# Patient Record
Sex: MALE | Race: OTHER | HISPANIC OR LATINO | URBAN - METROPOLITAN AREA
[De-identification: names, ages, dates, MRNs, and addresses within clinical notes are randomized per-mention and may not be internally consistent; named-entity substitution may affect disease eponyms.]

---

## 2020-08-10 ENCOUNTER — INPATIENT (INPATIENT)
Facility: HOSPITAL | Age: 50
LOS: 1 days | Discharge: HOME | DRG: 638 | End: 2020-08-12
Attending: HOSPITALIST | Admitting: STUDENT IN AN ORGANIZED HEALTH CARE EDUCATION/TRAINING PROGRAM
Payer: SELF-PAY

## 2020-08-10 VITALS
SYSTOLIC BLOOD PRESSURE: 193 MMHG | TEMPERATURE: 99 F | HEIGHT: 62 IN | DIASTOLIC BLOOD PRESSURE: 114 MMHG | RESPIRATION RATE: 18 BRPM | HEART RATE: 99 BPM | OXYGEN SATURATION: 98 % | WEIGHT: 203.05 LBS

## 2020-08-10 DIAGNOSIS — E11.00 TYPE 2 DIABETES MELLITUS WITH HYPEROSMOLARITY WITHOUT NONKETOTIC HYPERGLYCEMIC-HYPEROSMOLAR COMA (NKHHC): ICD-10-CM

## 2020-08-10 LAB
ACETONE SERPL-MCNC: ABNORMAL
ALBUMIN SERPL ELPH-MCNC: 4.5 G/DL — SIGNIFICANT CHANGE UP (ref 3.3–5.2)
ALP SERPL-CCNC: 296 U/L — HIGH (ref 40–120)
ALT FLD-CCNC: 178 U/L — HIGH
ANION GAP SERPL CALC-SCNC: 31 MMOL/L — HIGH (ref 5–17)
APPEARANCE UR: CLEAR — SIGNIFICANT CHANGE UP
AST SERPL-CCNC: 138 U/L — HIGH
BASE EXCESS BLDV CALC-SCNC: -5 MMOL/L — LOW (ref -2–2)
BASOPHILS # BLD AUTO: 0.03 K/UL — SIGNIFICANT CHANGE UP (ref 0–0.2)
BASOPHILS NFR BLD AUTO: 0.5 % — SIGNIFICANT CHANGE UP (ref 0–2)
BILIRUB SERPL-MCNC: 1 MG/DL — SIGNIFICANT CHANGE UP (ref 0.4–2)
BILIRUB UR-MCNC: NEGATIVE — SIGNIFICANT CHANGE UP
BUN SERPL-MCNC: 11 MG/DL — SIGNIFICANT CHANGE UP (ref 8–20)
CA-I SERPL-SCNC: 1.15 MMOL/L — SIGNIFICANT CHANGE UP (ref 1.15–1.33)
CALCIUM SERPL-MCNC: 9.8 MG/DL — SIGNIFICANT CHANGE UP (ref 8.6–10.2)
CHLORIDE BLDV-SCNC: 90 MMOL/L — LOW (ref 98–107)
CHLORIDE SERPL-SCNC: 78 MMOL/L — LOW (ref 98–107)
CO2 SERPL-SCNC: 16 MMOL/L — LOW (ref 22–29)
COLOR SPEC: YELLOW — SIGNIFICANT CHANGE UP
CREAT SERPL-MCNC: 0.76 MG/DL — SIGNIFICANT CHANGE UP (ref 0.5–1.3)
DIFF PNL FLD: NEGATIVE — SIGNIFICANT CHANGE UP
EOSINOPHIL # BLD AUTO: 0.01 K/UL — SIGNIFICANT CHANGE UP (ref 0–0.5)
EOSINOPHIL NFR BLD AUTO: 0.2 % — SIGNIFICANT CHANGE UP (ref 0–6)
GAS PNL BLDV: 131 MMOL/L — LOW (ref 135–145)
GAS PNL BLDV: SIGNIFICANT CHANGE UP
GAS PNL BLDV: SIGNIFICANT CHANGE UP
GLUCOSE BLDC GLUCOMTR-MCNC: 273 MG/DL — HIGH (ref 70–99)
GLUCOSE BLDC GLUCOMTR-MCNC: 367 MG/DL — HIGH (ref 70–99)
GLUCOSE BLDV-MCNC: 498 MG/DL — CRITICAL HIGH (ref 70–99)
GLUCOSE SERPL-MCNC: 606 MG/DL — CRITICAL HIGH (ref 70–99)
GLUCOSE UR QL: 1000 MG/DL
HCO3 BLDV-SCNC: 20 MMOL/L — LOW (ref 21–29)
HCT VFR BLD CALC: 40.7 % — SIGNIFICANT CHANGE UP (ref 39–50)
HCT VFR BLDA CALC: 46 — SIGNIFICANT CHANGE UP (ref 39–50)
HGB BLD CALC-MCNC: 14.9 G/DL — SIGNIFICANT CHANGE UP (ref 13–17)
HGB BLD-MCNC: 14.5 G/DL — SIGNIFICANT CHANGE UP (ref 13–17)
IMM GRANULOCYTES NFR BLD AUTO: 0.2 % — SIGNIFICANT CHANGE UP (ref 0–1.5)
KETONES UR-MCNC: ABNORMAL
LACTATE BLDV-MCNC: 1.6 MMOL/L — SIGNIFICANT CHANGE UP (ref 0.5–2)
LEUKOCYTE ESTERASE UR-ACNC: NEGATIVE — SIGNIFICANT CHANGE UP
LYMPHOCYTES # BLD AUTO: 2.37 K/UL — SIGNIFICANT CHANGE UP (ref 1–3.3)
LYMPHOCYTES # BLD AUTO: 41.6 % — SIGNIFICANT CHANGE UP (ref 13–44)
MAGNESIUM SERPL-MCNC: 2.1 MG/DL — SIGNIFICANT CHANGE UP (ref 1.6–2.6)
MCHC RBC-ENTMCNC: 33.2 PG — SIGNIFICANT CHANGE UP (ref 27–34)
MCHC RBC-ENTMCNC: 35.6 GM/DL — SIGNIFICANT CHANGE UP (ref 32–36)
MCV RBC AUTO: 93.1 FL — SIGNIFICANT CHANGE UP (ref 80–100)
MONOCYTES # BLD AUTO: 0.63 K/UL — SIGNIFICANT CHANGE UP (ref 0–0.9)
MONOCYTES NFR BLD AUTO: 11.1 % — SIGNIFICANT CHANGE UP (ref 2–14)
NEUTROPHILS # BLD AUTO: 2.65 K/UL — SIGNIFICANT CHANGE UP (ref 1.8–7.4)
NEUTROPHILS NFR BLD AUTO: 46.4 % — SIGNIFICANT CHANGE UP (ref 43–77)
NITRITE UR-MCNC: NEGATIVE — SIGNIFICANT CHANGE UP
NT-PROBNP SERPL-SCNC: 112 PG/ML — SIGNIFICANT CHANGE UP (ref 0–300)
OTHER CELLS CSF MANUAL: 20 ML/DL — SIGNIFICANT CHANGE UP (ref 18–22)
PCO2 BLDV: 33 MMHG — LOW (ref 35–50)
PH BLDV: 7.38 — SIGNIFICANT CHANGE UP (ref 7.32–7.43)
PH UR: 5 — SIGNIFICANT CHANGE UP (ref 5–8)
PLATELET # BLD AUTO: 137 K/UL — LOW (ref 150–400)
PO2 BLDV: 100 MMHG — HIGH (ref 25–45)
POTASSIUM BLDV-SCNC: 4.2 MMOL/L — SIGNIFICANT CHANGE UP (ref 3.4–4.5)
POTASSIUM SERPL-MCNC: 4.1 MMOL/L — SIGNIFICANT CHANGE UP (ref 3.5–5.3)
POTASSIUM SERPL-SCNC: 4.1 MMOL/L — SIGNIFICANT CHANGE UP (ref 3.5–5.3)
PROT SERPL-MCNC: 8.3 G/DL — SIGNIFICANT CHANGE UP (ref 6.6–8.7)
PROT UR-MCNC: NEGATIVE MG/DL — SIGNIFICANT CHANGE UP
RBC # BLD: 4.37 M/UL — SIGNIFICANT CHANGE UP (ref 4.2–5.8)
RBC # FLD: 11.8 % — SIGNIFICANT CHANGE UP (ref 10.3–14.5)
SAO2 % BLDV: 98 % — SIGNIFICANT CHANGE UP
SODIUM SERPL-SCNC: 125 MMOL/L — LOW (ref 135–145)
SP GR SPEC: 1.02 — SIGNIFICANT CHANGE UP (ref 1.01–1.02)
TROPONIN T SERPL-MCNC: <0.01 NG/ML — SIGNIFICANT CHANGE UP (ref 0–0.06)
UROBILINOGEN FLD QL: NEGATIVE MG/DL — SIGNIFICANT CHANGE UP
WBC # BLD: 5.7 K/UL — SIGNIFICANT CHANGE UP (ref 3.8–10.5)
WBC # FLD AUTO: 5.7 K/UL — SIGNIFICANT CHANGE UP (ref 3.8–10.5)

## 2020-08-10 PROCEDURE — 71045 X-RAY EXAM CHEST 1 VIEW: CPT | Mod: 26

## 2020-08-10 PROCEDURE — 99291 CRITICAL CARE FIRST HOUR: CPT

## 2020-08-10 PROCEDURE — 93010 ELECTROCARDIOGRAM REPORT: CPT

## 2020-08-10 PROCEDURE — 99221 1ST HOSP IP/OBS SF/LOW 40: CPT

## 2020-08-10 PROCEDURE — 70450 CT HEAD/BRAIN W/O DYE: CPT | Mod: 26

## 2020-08-10 RX ORDER — INSULIN HUMAN 100 [IU]/ML
2 INJECTION, SOLUTION SUBCUTANEOUS
Qty: 100 | Refills: 0 | Status: DISCONTINUED | OUTPATIENT
Start: 2020-08-10 | End: 2020-08-11

## 2020-08-10 RX ORDER — SODIUM CHLORIDE 9 MG/ML
2000 INJECTION INTRAMUSCULAR; INTRAVENOUS; SUBCUTANEOUS ONCE
Refills: 0 | Status: COMPLETED | OUTPATIENT
Start: 2020-08-10 | End: 2020-08-10

## 2020-08-10 RX ORDER — SODIUM CHLORIDE 9 MG/ML
1000 INJECTION, SOLUTION INTRAVENOUS
Refills: 0 | Status: DISCONTINUED | OUTPATIENT
Start: 2020-08-10 | End: 2020-08-10

## 2020-08-10 RX ORDER — SODIUM CHLORIDE 9 MG/ML
1000 INJECTION, SOLUTION INTRAVENOUS
Refills: 0 | Status: DISCONTINUED | OUTPATIENT
Start: 2020-08-10 | End: 2020-08-11

## 2020-08-10 RX ADMIN — INSULIN HUMAN 9 UNIT(S)/HR: 100 INJECTION, SOLUTION SUBCUTANEOUS at 21:37

## 2020-08-10 RX ADMIN — SODIUM CHLORIDE 200 MILLILITER(S): 9 INJECTION, SOLUTION INTRAVENOUS at 23:58

## 2020-08-10 RX ADMIN — INSULIN HUMAN 5 UNIT(S)/HR: 100 INJECTION, SOLUTION SUBCUTANEOUS at 22:53

## 2020-08-10 RX ADMIN — SODIUM CHLORIDE 200 MILLILITER(S): 9 INJECTION, SOLUTION INTRAVENOUS at 22:53

## 2020-08-10 RX ADMIN — SODIUM CHLORIDE 2000 MILLILITER(S): 9 INJECTION INTRAMUSCULAR; INTRAVENOUS; SUBCUTANEOUS at 21:38

## 2020-08-10 NOTE — ED STATDOCS - PROGRESS NOTE DETAILS
50 y/o M pt with no PMHx presents to the ED c/o dizziness, weight loss, fatigue, and increased urination for the past 1 month.  He also notes his BS has been elevated recently.  Denies abdominal pain.  Pt will be sent to the Main ED for further treatment and evaluation. Initial orders placed.

## 2020-08-10 NOTE — ED ADULT NURSE REASSESSMENT NOTE - NS ED NURSE REASSESS COMMENT FT1
pt resting comfortably at this time. offering no complaints. pt aware of need for insulin drip. aware of ICU admit. insulin verified by two RNS. cm in place NSR HR 79. pt resting comfortably at this time. offering no complaints. pt aware of need for insulin drip. aware of ICU admit. insulin verified by two RNS. cm in place NSR HR 79. FS prior to admin of insulin 423. MD crain aware. will notify if FS less than 300.

## 2020-08-10 NOTE — ED PROVIDER NOTE - OBJECTIVE STATEMENT
50y/o M with no significant PMHx presents to the ED c/o dizziness and generalized weakness. Assoc. sx of urinary frequency. Pt states that he presented to [facility] clinic today for dizziness, nausea, weakness and urinary frequency which has been occurring for the past month, had his bglu checked and was instructed to f/u at ED after bglu was 500+. Denies current complaints of dizziness. Denies fever, chills, vomiting or CP.   : Radha

## 2020-08-10 NOTE — H&P ADULT - ASSESSMENT
49M without any reported PMH who presents with dizziness and leg weakness, f/w hyperglycemia, DKA/HHS    Impression:  - Hyperglycemia  - DKA/HHS  - Dizziness - likely 2/2 dehydration in setting of DKA/HHS  - HAGMA  - Concomitant metabolic alkalosis with hypochloremia  - Hyponatremia    Plan:  - Check urine studies including ketones  - Will check beta hydroxybutyrate   - Pt started in insulin gtt  - Monitor for hypoglycemia, needs FSG Q1H  - Q6H BMP, hold insulin gtt for K <3.3 (last K 4.1)  - Keeping NPO for now  - When anion gap closed x2 will start weight-based long acting insulin  - Will need endocrine follow-up  - Nutrition evaluation  - DVT PPx  - Admit to MICU for management of DKA/HHS    Dale Oh M.D.  Pulmonary & Critical Care Attending  [facility]  Pulmonary Medicine at [place]

## 2020-08-10 NOTE — H&P ADULT - HISTORY OF PRESENT ILLNESS
Patient is a 49y old  Male who presents with a chief complaint of     BRIEF HOSPITAL COURSE:   49M without any reported PMH who presents with dizziness and leg weakness. He was found with hyperglycemia and DKA/HHS. He reports over the past 1 month he has not been feeling well, and has been frequently urinating multiple times per day. He does report drinking sugary drinks. Denies any fevers, chills, CP, palpitations, SOB, cough, N/V/D. He denies any syncope. In ED found with hyperglycemia and DKA/HHS, was given IVF and started on insulin gtt. ICU consulted for further management.    PAST MEDICAL & SURGICAL HISTORY:  No pertinent past medical history  No significant past surgical history    Allergies    No Known Allergies    Intolerances      FAMILY HISTORY:    Family history otherwise noncontributory.    Social History:   Denies smoking    Review of Systems:  CONSTITUTIONAL:  No fevers, chills  HEAD: No headache, +lightheadedness  EYES: No blurry vision  ENT: No epistaxis, rhinorrhea, sore throat  CARDIOVASCULAR:  No chest pain, no palpitations  RESPIRATORY:  As per HPI  GASTROINTESTINAL:  No N/V/D  GENITOURINARY:  +Urinary frequency  NEUROLOGIC:  No seizures or headaches  PSYCHIATRIC:  No disorder of thought or mood    ALL OTHER REVIEW OF SYSTEMS EXCEPT PER HPI NEGATIVE.      Medications:                  insulin regular Infusion 9 Unit(s)/Hr IV Continuous <Continuous>    multiple electrolytes Injection Type 1 1000 milliLiter(s) IV Continuous <Continuous>                ICU Vital Signs Last 24 Hrs  T(C): 37.3 (10 Aug 2020 17:58), Max: 37.3 (10 Aug 2020 17:58)  T(F): 99.2 (10 Aug 2020 17:58), Max: 99.2 (10 Aug 2020 17:58)  HR: 99 (10 Aug 2020 17:58) (99 - 99)  BP: 193/114 (10 Aug 2020 17:58) (193/114 - 193/114)  BP(mean): --  ABP: --  ABP(mean): --  RR: 18 (10 Aug 2020 17:58) (18 - 18)  SpO2: 98% (10 Aug 2020 17:58) (98% - 98%)    Vital Signs Last 24 Hrs  T(C): 37.3 (10 Aug 2020 17:58), Max: 37.3 (10 Aug 2020 17:58)  T(F): 99.2 (10 Aug 2020 17:58), Max: 99.2 (10 Aug 2020 17:58)  HR: 99 (10 Aug 2020 17:58) (99 - 99)  BP: 193/114 (10 Aug 2020 17:58) (193/114 - 193/114)  BP(mean): --  RR: 18 (10 Aug 2020 17:58) (18 - 18)  SpO2: 98% (10 Aug 2020 17:58) (98% - 98%)        I&O's Detail        LABS:                        14.5   5.70  )-----------( 137      ( 10 Aug 2020 19:47 )             40.7     08-10    125<L>  |  78<L>  |  11.0  ----------------------------<  606<HH>  4.1   |  16.0<L>  |  0.76    Ca    9.8      10 Aug 2020 19:47  Mg     2.1     08-10    TPro  8.3  /  Alb  4.5  /  TBili  1.0  /  DBili  x   /  AST  138<H>  /  ALT  178<H>  /  AlkPhos  296<H>  08-10      CARDIAC MARKERS ( 10 Aug 2020 19:47 )  x     / <0.01 ng/mL / x     / x     / x          CAPILLARY BLOOD GLUCOSE      CULTURES: N/A      Physical Examination:  GENERAL: In NAD   HEENT: NC/AT  NECK: Supple, trachea midline  PULM: CTA B/L, good inspiratory effort  CVS: +S1, S2, RRR  ABD: Soft, NT/ND  EXT: No pedal edema  SKIN: Warm and well perfused, no rashes noted.  NEURO: Alert, oriented and interactive, non-focal    DEVICES:     RADIOLOGY:   [facility] (8/10/20) - no acute intracranial findings    CRITICAL CARE TIME SPENT: 30

## 2020-08-11 LAB
A1C WITH ESTIMATED AVERAGE GLUCOSE RESULT: 15.5 % — HIGH (ref 4–5.6)
ANION GAP SERPL CALC-SCNC: 16 MMOL/L — SIGNIFICANT CHANGE UP (ref 5–17)
ANION GAP SERPL CALC-SCNC: 17 MMOL/L — SIGNIFICANT CHANGE UP (ref 5–17)
ANION GAP SERPL CALC-SCNC: 22 MMOL/L — HIGH (ref 5–17)
B-OH-BUTYR SERPL-SCNC: 4.4 MMOL/L — HIGH
BUN SERPL-MCNC: 7 MG/DL — LOW (ref 8–20)
BUN SERPL-MCNC: 7 MG/DL — LOW (ref 8–20)
BUN SERPL-MCNC: 8 MG/DL — SIGNIFICANT CHANGE UP (ref 8–20)
CALCIUM SERPL-MCNC: 8.6 MG/DL — SIGNIFICANT CHANGE UP (ref 8.6–10.2)
CALCIUM SERPL-MCNC: 8.6 MG/DL — SIGNIFICANT CHANGE UP (ref 8.6–10.2)
CALCIUM SERPL-MCNC: 8.7 MG/DL — SIGNIFICANT CHANGE UP (ref 8.6–10.2)
CHLORIDE SERPL-SCNC: 89 MMOL/L — LOW (ref 98–107)
CHLORIDE SERPL-SCNC: 90 MMOL/L — LOW (ref 98–107)
CHLORIDE SERPL-SCNC: 90 MMOL/L — LOW (ref 98–107)
CHLORIDE UR-SCNC: 109 MMOL/L — SIGNIFICANT CHANGE UP
CO2 SERPL-SCNC: 20 MMOL/L — LOW (ref 22–29)
CO2 SERPL-SCNC: 24 MMOL/L — SIGNIFICANT CHANGE UP (ref 22–29)
CO2 SERPL-SCNC: 24 MMOL/L — SIGNIFICANT CHANGE UP (ref 22–29)
CREAT ?TM UR-MCNC: 59 MG/DL — SIGNIFICANT CHANGE UP
CREAT SERPL-MCNC: 0.46 MG/DL — LOW (ref 0.5–1.3)
CREAT SERPL-MCNC: 0.48 MG/DL — LOW (ref 0.5–1.3)
CREAT SERPL-MCNC: 0.6 MG/DL — SIGNIFICANT CHANGE UP (ref 0.5–1.3)
ESTIMATED AVERAGE GLUCOSE: 398 MG/DL — HIGH (ref 68–114)
GLUCOSE BLDC GLUCOMTR-MCNC: 154 MG/DL — HIGH (ref 70–99)
GLUCOSE BLDC GLUCOMTR-MCNC: 175 MG/DL — HIGH (ref 70–99)
GLUCOSE BLDC GLUCOMTR-MCNC: 183 MG/DL — HIGH (ref 70–99)
GLUCOSE BLDC GLUCOMTR-MCNC: 208 MG/DL — HIGH (ref 70–99)
GLUCOSE BLDC GLUCOMTR-MCNC: 219 MG/DL — HIGH (ref 70–99)
GLUCOSE BLDC GLUCOMTR-MCNC: 227 MG/DL — HIGH (ref 70–99)
GLUCOSE BLDC GLUCOMTR-MCNC: 231 MG/DL — HIGH (ref 70–99)
GLUCOSE BLDC GLUCOMTR-MCNC: 244 MG/DL — HIGH (ref 70–99)
GLUCOSE BLDC GLUCOMTR-MCNC: 256 MG/DL — HIGH (ref 70–99)
GLUCOSE BLDC GLUCOMTR-MCNC: 258 MG/DL — HIGH (ref 70–99)
GLUCOSE BLDC GLUCOMTR-MCNC: 263 MG/DL — HIGH (ref 70–99)
GLUCOSE BLDC GLUCOMTR-MCNC: 277 MG/DL — HIGH (ref 70–99)
GLUCOSE BLDC GLUCOMTR-MCNC: 289 MG/DL — HIGH (ref 70–99)
GLUCOSE BLDC GLUCOMTR-MCNC: 295 MG/DL — HIGH (ref 70–99)
GLUCOSE BLDC GLUCOMTR-MCNC: 301 MG/DL — HIGH (ref 70–99)
GLUCOSE BLDC GLUCOMTR-MCNC: 423 MG/DL — HIGH (ref 70–99)
GLUCOSE SERPL-MCNC: 221 MG/DL — HIGH (ref 70–99)
GLUCOSE SERPL-MCNC: 259 MG/DL — HIGH (ref 70–99)
GLUCOSE SERPL-MCNC: 284 MG/DL — HIGH (ref 70–99)
HCT VFR BLD CALC: 36.9 % — LOW (ref 39–50)
HGB BLD-MCNC: 13.2 G/DL — SIGNIFICANT CHANGE UP (ref 13–17)
MAGNESIUM SERPL-MCNC: 1.8 MG/DL — SIGNIFICANT CHANGE UP (ref 1.6–2.6)
MAGNESIUM SERPL-MCNC: 1.9 MG/DL — SIGNIFICANT CHANGE UP (ref 1.6–2.6)
MCHC RBC-ENTMCNC: 32.8 PG — SIGNIFICANT CHANGE UP (ref 27–34)
MCHC RBC-ENTMCNC: 35.8 GM/DL — SIGNIFICANT CHANGE UP (ref 32–36)
MCV RBC AUTO: 91.8 FL — SIGNIFICANT CHANGE UP (ref 80–100)
OSMOLALITY UR: 738 MOSM/KG — SIGNIFICANT CHANGE UP (ref 300–1000)
PH UR: 5 — SIGNIFICANT CHANGE UP (ref 5–8)
PHOSPHATE SERPL-MCNC: 2.4 MG/DL — SIGNIFICANT CHANGE UP (ref 2.4–4.7)
PHOSPHATE SERPL-MCNC: 2.9 MG/DL — SIGNIFICANT CHANGE UP (ref 2.4–4.7)
PLATELET # BLD AUTO: 123 K/UL — LOW (ref 150–400)
POTASSIUM SERPL-MCNC: 2.6 MMOL/L — CRITICAL LOW (ref 3.5–5.3)
POTASSIUM SERPL-MCNC: 3.3 MMOL/L — LOW (ref 3.5–5.3)
POTASSIUM SERPL-MCNC: 3.3 MMOL/L — LOW (ref 3.5–5.3)
POTASSIUM SERPL-SCNC: 2.6 MMOL/L — CRITICAL LOW (ref 3.5–5.3)
POTASSIUM SERPL-SCNC: 3.3 MMOL/L — LOW (ref 3.5–5.3)
POTASSIUM SERPL-SCNC: 3.3 MMOL/L — LOW (ref 3.5–5.3)
PROT ?TM UR-MCNC: 12 MG/DL — SIGNIFICANT CHANGE UP (ref 0–12)
RBC # BLD: 4.02 M/UL — LOW (ref 4.2–5.8)
RBC # FLD: 11.6 % — SIGNIFICANT CHANGE UP (ref 10.3–14.5)
SARS-COV-2 RNA SPEC QL NAA+PROBE: SIGNIFICANT CHANGE UP
SODIUM SERPL-SCNC: 129 MMOL/L — LOW (ref 135–145)
SODIUM SERPL-SCNC: 130 MMOL/L — LOW (ref 135–145)
SODIUM SERPL-SCNC: 132 MMOL/L — LOW (ref 135–145)
SODIUM UR-SCNC: 97 MMOL/L — SIGNIFICANT CHANGE UP
WBC # BLD: 4.82 K/UL — SIGNIFICANT CHANGE UP (ref 3.8–10.5)
WBC # FLD AUTO: 4.82 K/UL — SIGNIFICANT CHANGE UP (ref 3.8–10.5)

## 2020-08-11 PROCEDURE — 99233 SBSQ HOSP IP/OBS HIGH 50: CPT

## 2020-08-11 RX ORDER — DEXTROSE 50 % IN WATER 50 %
25 SYRINGE (ML) INTRAVENOUS ONCE
Refills: 0 | Status: DISCONTINUED | OUTPATIENT
Start: 2020-08-11 | End: 2020-08-11

## 2020-08-11 RX ORDER — MAGNESIUM SULFATE 500 MG/ML
2 VIAL (ML) INJECTION ONCE
Refills: 0 | Status: COMPLETED | OUTPATIENT
Start: 2020-08-11 | End: 2020-08-11

## 2020-08-11 RX ORDER — DEXTROSE 50 % IN WATER 50 %
12.5 SYRINGE (ML) INTRAVENOUS ONCE
Refills: 0 | Status: DISCONTINUED | OUTPATIENT
Start: 2020-08-11 | End: 2020-08-11

## 2020-08-11 RX ORDER — GLUCAGON INJECTION, SOLUTION 0.5 MG/.1ML
1 INJECTION, SOLUTION SUBCUTANEOUS ONCE
Refills: 0 | Status: DISCONTINUED | OUTPATIENT
Start: 2020-08-11 | End: 2020-08-11

## 2020-08-11 RX ORDER — INSULIN LISPRO 100/ML
VIAL (ML) SUBCUTANEOUS
Refills: 0 | Status: DISCONTINUED | OUTPATIENT
Start: 2020-08-11 | End: 2020-08-12

## 2020-08-11 RX ORDER — LISINOPRIL 2.5 MG/1
5 TABLET ORAL DAILY
Refills: 0 | Status: DISCONTINUED | OUTPATIENT
Start: 2020-08-11 | End: 2020-08-12

## 2020-08-11 RX ORDER — POTASSIUM CHLORIDE 20 MEQ
40 PACKET (EA) ORAL ONCE
Refills: 0 | Status: COMPLETED | OUTPATIENT
Start: 2020-08-11 | End: 2020-08-11

## 2020-08-11 RX ORDER — SODIUM CHLORIDE 9 MG/ML
1000 INJECTION INTRAMUSCULAR; INTRAVENOUS; SUBCUTANEOUS
Refills: 0 | Status: DISCONTINUED | OUTPATIENT
Start: 2020-08-11 | End: 2020-08-11

## 2020-08-11 RX ORDER — LISINOPRIL 2.5 MG/1
1 TABLET ORAL
Qty: 30 | Refills: 0
Start: 2020-08-11 | End: 2020-09-09

## 2020-08-11 RX ORDER — INSULIN LISPRO 100/ML
5 VIAL (ML) SUBCUTANEOUS
Refills: 0 | Status: DISCONTINUED | OUTPATIENT
Start: 2020-08-11 | End: 2020-08-11

## 2020-08-11 RX ORDER — POTASSIUM CHLORIDE 20 MEQ
10 PACKET (EA) ORAL
Refills: 0 | Status: COMPLETED | OUTPATIENT
Start: 2020-08-11 | End: 2020-08-11

## 2020-08-11 RX ORDER — SODIUM CHLORIDE 9 MG/ML
1000 INJECTION, SOLUTION INTRAVENOUS
Refills: 0 | Status: DISCONTINUED | OUTPATIENT
Start: 2020-08-11 | End: 2020-08-11

## 2020-08-11 RX ORDER — ENOXAPARIN SODIUM 100 MG/ML
40 INJECTION SUBCUTANEOUS DAILY
Refills: 0 | Status: DISCONTINUED | OUTPATIENT
Start: 2020-08-12 | End: 2020-08-12

## 2020-08-11 RX ORDER — POTASSIUM CHLORIDE 20 MEQ
40 PACKET (EA) ORAL ONCE
Refills: 0 | Status: COMPLETED | OUTPATIENT
Start: 2020-08-11 | End: 2020-08-12

## 2020-08-11 RX ORDER — INSULIN GLARGINE 100 [IU]/ML
30 INJECTION, SOLUTION SUBCUTANEOUS EVERY MORNING
Refills: 0 | Status: DISCONTINUED | OUTPATIENT
Start: 2020-08-11 | End: 2020-08-12

## 2020-08-11 RX ORDER — DEXTROSE 50 % IN WATER 50 %
15 SYRINGE (ML) INTRAVENOUS ONCE
Refills: 0 | Status: DISCONTINUED | OUTPATIENT
Start: 2020-08-11 | End: 2020-08-11

## 2020-08-11 RX ORDER — AMLODIPINE BESYLATE 2.5 MG/1
5 TABLET ORAL ONCE
Refills: 0 | Status: COMPLETED | OUTPATIENT
Start: 2020-08-11 | End: 2020-08-11

## 2020-08-11 RX ORDER — INSULIN LISPRO 100/ML
7 VIAL (ML) SUBCUTANEOUS
Refills: 0 | Status: DISCONTINUED | OUTPATIENT
Start: 2020-08-11 | End: 2020-08-12

## 2020-08-11 RX ADMIN — Medication 100 MILLIEQUIVALENT(S): at 03:22

## 2020-08-11 RX ADMIN — Medication 40 MILLIEQUIVALENT(S): at 12:43

## 2020-08-11 RX ADMIN — INSULIN GLARGINE 30 UNIT(S): 100 INJECTION, SOLUTION SUBCUTANEOUS at 10:37

## 2020-08-11 RX ADMIN — Medication 4: at 16:20

## 2020-08-11 RX ADMIN — Medication 100 MILLIEQUIVALENT(S): at 07:04

## 2020-08-11 RX ADMIN — Medication 100 MILLIEQUIVALENT(S): at 05:46

## 2020-08-11 RX ADMIN — Medication 100 MILLIEQUIVALENT(S): at 10:39

## 2020-08-11 RX ADMIN — SODIUM CHLORIDE 75 MILLILITER(S): 9 INJECTION, SOLUTION INTRAVENOUS at 17:58

## 2020-08-11 RX ADMIN — Medication 100 MILLIEQUIVALENT(S): at 11:37

## 2020-08-11 RX ADMIN — Medication 40 MILLIEQUIVALENT(S): at 02:26

## 2020-08-11 RX ADMIN — Medication 100 MILLIEQUIVALENT(S): at 12:44

## 2020-08-11 RX ADMIN — Medication 50 GRAM(S): at 02:25

## 2020-08-11 RX ADMIN — Medication 5 UNIT(S): at 16:20

## 2020-08-11 RX ADMIN — INSULIN HUMAN 2 UNIT(S)/HR: 100 INJECTION, SOLUTION SUBCUTANEOUS at 06:12

## 2020-08-11 RX ADMIN — AMLODIPINE BESYLATE 5 MILLIGRAM(S): 2.5 TABLET ORAL at 12:44

## 2020-08-11 RX ADMIN — Medication 6: at 11:05

## 2020-08-11 RX ADMIN — LISINOPRIL 5 MILLIGRAM(S): 2.5 TABLET ORAL at 17:58

## 2020-08-11 RX ADMIN — Medication 5 UNIT(S): at 11:05

## 2020-08-11 RX ADMIN — Medication 50 GRAM(S): at 10:38

## 2020-08-11 RX ADMIN — SODIUM CHLORIDE 100 MILLILITER(S): 9 INJECTION INTRAMUSCULAR; INTRAVENOUS; SUBCUTANEOUS at 10:44

## 2020-08-11 NOTE — PROGRESS NOTE ADULT - SUBJECTIVE AND OBJECTIVE BOX
Downgrade from MICU acceptance Note    HPI:  Patient is a 49y old  Male who presents with a chief complaint of Dizziness, polyuria.     BRIEF HOSPITAL COURSE:   49M without any reported PMHx, does not really see PCP, questionable HTN in the past, who presented with c/o dizziness, leg weakness and urinary frequency. He was found to have hyperglycemia and DKA/HHS. He reports over the past 1 month he has not been feeling well, and has been frequently urinating multiple times per day. He does report drinking sugary drinks.    Denies any fevers, chills, CP, palpitations, SOB, cough, N/V/D. He denies any syncope.    In ED found to have hyperglycemia, anion gap metabolic acidosis, was given aggressive fluid resuscitation and started on insulin gtt. Now anion gap closed and patient downgraded from MICU care.    PAST MEDICAL & SURGICAL HISTORY:  No pertinent past medical history  No significant past surgical history    Allergies: No Known Allergies    FAMILY HISTORY: HTN on father side, Sister has DM    Family history otherwise noncontributory.    Social History: daily ETOH use  Hx of smoking cigars.     Review of Systems:  CONSTITUTIONAL:  No fevers, chills  HEAD: No headache, +lightheadedness  EYES: No blurry vision  ENT: No epistaxis, rhinorrhea, sore throat  CARDIOVASCULAR:  No chest pain, no palpitations  RESPIRATORY:  As per HPI  GASTROINTESTINAL:  No N/V/D  GENITOURINARY:  +Urinary frequency  NEUROLOGIC:  No seizures or headaches  PSYCHIATRIC:  No disorder of thought or mood    ALL OTHER REVIEW OF SYSTEMS EXCEPT PER HPI NEGATIVE.    Vital Signs Last 24 Hrs  T(C): 37.3 (10 Aug 2020 17:58), Max: 37.3 (10 Aug 2020 17:58)  T(F): 99.2 (10 Aug 2020 17:58), Max: 99.2 (10 Aug 2020 17:58)  HR: 99 (10 Aug 2020 17:58) (99 - 99)  BP: 193/114 (10 Aug 2020 17:58) (193/114 - 193/114)  BP(mean): --  RR: 18 (10 Aug 2020 17:58) (18 - 18)  SpO2: 98% (10 Aug 2020 17:58) (98% - 98%)    Physical Exam:   GENERAL: well-groomed, well-developed, NAD  HEENT: head NC/AT; EOM intact, PERRLA, conjunctiva & sclera clear; moist mucous membranes, good dentition  NECK: supple, no JVD, no thyromegaly  RESPIRATORY: CTA B/L, no wheezing, rales, rhonchi or rubs  CARDIOVASCULAR: S1&S2, RRR, no murmurs or gallops  ABDOMEN: soft, non-tender, non-distended, BS+,   MUSCULOSKELETAL: no muscle atrophy, no clubbing, cyanosis or edema of extremities, no calf tenderness   VASCULAR: peripheral pulses 2+,  SKIN: No rashes, bruises or scars   NEUROLOGIC: AA&O X3, CN2-12 intact w/ no focal deficits    LABS:                        14.5   5.70  )-----------( 137      ( 10 Aug 2020 19:47 )             40.7     08-10    125<L>  |  78<L>  |  11.0  ----------------------------<  606<HH>  4.1   |  16.0<L>  |  0.76    Ca    9.8      10 Aug 2020 19:47  Mg     2.1     08-10    TPro  8.3  /  Alb  4.5  /  TBili  1.0  /  DBili  x   /  AST  138<H>  /  ALT  178<H>  /  AlkPhos  296<H>  08-10      CARDIAC MARKERS ( 10 Aug 2020 19:47 )  x     / <0.01 ng/mL / x     / x     / x          CAPILLARY BLOOD GLUCOSE  POCT Blood Glucose.: 301 mg/dL (11 Aug 2020 13:01)  POCT Blood Glucose.: 289 mg/dL (11 Aug 2020 11:55)  POCT Blood Glucose.: 295 mg/dL (11 Aug 2020 11:01)  POCT Blood Glucose.: 258 mg/dL (11 Aug 2020 10:00)  POCT Blood Glucose.: 256 mg/dL (11 Aug 2020 09:02)  POCT Blood Glucose.: 227 mg/dL (11 Aug 2020 08:04)  POCT Blood Glucose.: 175 mg/dL (11 Aug 2020 07:03)  POCT Blood Glucose.: 154 mg/dL (11 Aug 2020 06:06)  POCT Blood Glucose.: 183 mg/dL (11 Aug 2020 04:58)  POCT Blood Glucose.: 208 mg/dL (11 Aug 2020 03:51)  POCT Blood Glucose.: 219 mg/dL (11 Aug 2020 03:03)  POCT Blood Glucose.: 263 mg/dL (11 Aug 2020 01:42)  POCT Blood Glucose.: 277 mg/dL (11 Aug 2020 00:39)  POCT Blood Glucose.: 273 mg/dL (10 Aug 2020 23:38)  POCT Blood Glucose.: 367 mg/dL (10 Aug 2020 22:31)  POCT Blood Glucose.: 423 mg/dL (10 Aug 2020 21:31)      RADIOLOGY:   [facility] (8/10/20) - no acute intracranial findings    < from: Xray Chest 1 View-PORTABLE IMMEDIATE (08.10.20 @ 20:12) >    INTERPRETATION:  AP chest on August 10, 2020 at 7:48 PM. Patient has dizziness and chest pain with shortness of breath. Covid virus testing is pending.Patient's blood glucose was over 500.    COMPARISON: None available.    Heart is normal for projection.    The lung fields and pleural surfaces are unremarkable.    IMPRESSION: Negative chest.    < end of copied text > Downgrade from MICU acceptance Note    HPI:  Patient is a 49y old  Male who presents with a chief complaint of Dizziness, polyuria.     BRIEF HOSPITAL COURSE:   49M without any reported PMHx, does not really see PCP, questionable HTN in the past, who presented with c/o dizziness, leg weakness and urinary frequency. He was found to have hyperglycemia and DKA/HHS. He reports over the past 1 month he has not been feeling well, and has been frequently urinating multiple times per day. He does report drinking sugary drinks.    Denies any fevers, chills, CP, palpitations, SOB, cough, N/V/D. He denies any syncope.    In ED found to have hyperglycemia, anion gap metabolic acidosis, was given aggressive fluid resuscitation and started on insulin gtt. Now anion gap closed and patient downgraded from MICU care.    PAST MEDICAL & SURGICAL HISTORY:  No pertinent past medical history - has not seen a PCP in 8+ years  No significant past surgical history    Allergies: No Known Allergies    FAMILY HISTORY: HTN on father side, Sister has DM    Family history otherwise noncontributory.    Social History: daily ETOH use  Hx of smoking cigars.     Review of Systems:  CONSTITUTIONAL:  No fevers, chills  HEAD: No headache,   CARDIOVASCULAR:  No chest pain, no palpitations  RESPIRATORY:  NO SOB, cough, no difficulty breathing  GASTROINTESTINAL:  No N/V/D, no abdominal pain  GENITOURINARY:  +Urinary frequency  NEUROLOGIC:  No seizures or headaches    ALL OTHER REVIEW OF SYSTEMS EXCEPT PER HPI NEGATIVE.    Vital Signs Last 24 Hrs  T(C): 37.3 (10 Aug 2020 17:58), Max: 37.3 (10 Aug 2020 17:58)  T(F): 99.2 (10 Aug 2020 17:58), Max: 99.2 (10 Aug 2020 17:58)  HR: 99 (10 Aug 2020 17:58) (99 - 99)  BP: 193/114 (10 Aug 2020 17:58) (193/114 - 193/114)  BP(mean): --  RR: 18 (10 Aug 2020 17:58) (18 - 18)  SpO2: 98% (10 Aug 2020 17:58) (98% - 98%)    Physical Exam:   GENERAL: well-groomed, well-developed, NAD  HEENT: head NC/AT; EOM intact, PERRLA, conjunctiva & sclera clear; moist mucous membranes, good dentition  NECK: supple, no JVD, no thyromegaly  RESPIRATORY: CTA B/L, no wheezing, rales, rhonchi or rubs  CARDIOVASCULAR: S1&S2, RRR, no murmurs or gallops  ABDOMEN: soft, non-tender, non-distended, BS+,   MUSCULOSKELETAL: no muscle atrophy, no clubbing, cyanosis or edema of extremities, no calf tenderness   VASCULAR: peripheral pulses 2+,  SKIN: No rashes, bruises or scars   NEUROLOGIC: AA&O X3, CN2-12 intact w/ no focal deficits    LABS:                        14.5   5.70  )-----------( 137      ( 10 Aug 2020 19:47 )             40.7     08-10    125<L>  |  78<L>  |  11.0  ----------------------------<  606<HH>  4.1   |  16.0<L>  |  0.76    Ca    9.8      10 Aug 2020 19:47  Mg     2.1     08-10    TPro  8.3  /  Alb  4.5  /  TBili  1.0  /  DBili  x   /  AST  138<H>  /  ALT  178<H>  /  AlkPhos  296<H>  08-10      CARDIAC MARKERS ( 10 Aug 2020 19:47 )  x     / <0.01 ng/mL / x     / x     / x          CAPILLARY BLOOD GLUCOSE  POCT Blood Glucose.: 301 mg/dL (11 Aug 2020 13:01)  POCT Blood Glucose.: 289 mg/dL (11 Aug 2020 11:55)  POCT Blood Glucose.: 295 mg/dL (11 Aug 2020 11:01)  POCT Blood Glucose.: 258 mg/dL (11 Aug 2020 10:00)  POCT Blood Glucose.: 256 mg/dL (11 Aug 2020 09:02)  POCT Blood Glucose.: 227 mg/dL (11 Aug 2020 08:04)  POCT Blood Glucose.: 175 mg/dL (11 Aug 2020 07:03)  POCT Blood Glucose.: 154 mg/dL (11 Aug 2020 06:06)  POCT Blood Glucose.: 183 mg/dL (11 Aug 2020 04:58)  POCT Blood Glucose.: 208 mg/dL (11 Aug 2020 03:51)  POCT Blood Glucose.: 219 mg/dL (11 Aug 2020 03:03)  POCT Blood Glucose.: 263 mg/dL (11 Aug 2020 01:42)  POCT Blood Glucose.: 277 mg/dL (11 Aug 2020 00:39)  POCT Blood Glucose.: 273 mg/dL (10 Aug 2020 23:38)  POCT Blood Glucose.: 367 mg/dL (10 Aug 2020 22:31)  POCT Blood Glucose.: 423 mg/dL (10 Aug 2020 21:31)      RADIOLOGY:   [facility] (8/10/20) - no acute intracranial findings    < from: Xray Chest 1 View-PORTABLE IMMEDIATE (08.10.20 @ 20:12) >    INTERPRETATION:  AP chest on August 10, 2020 at 7:48 PM. Patient has dizziness and chest pain with shortness of breath. Covid virus testing is pending.Patient's blood glucose was over 500.    COMPARISON: None available.    Heart is normal for projection.    The lung fields and pleural surfaces are unremarkable.    IMPRESSION: Negative chest.    < end of copied text >

## 2020-08-11 NOTE — ED ADULT NURSE REASSESSMENT NOTE - NS ED NURSE REASSESS COMMENT FT1
Spoke with KATHERINE Ng, was informed pt with be bridged with lantis and given K and Mg,  pt will also be downgraded.

## 2020-08-11 NOTE — PROGRESS NOTE ADULT - ASSESSMENT
49M without any reported PMH who presents with dizziness and leg weakness, f/w hyperglycemia, DKA/HHS    DKA/HHS  - s/p Insulin gtt - FS remain elevated  - anion gap now closed  - urine +ketone/hydroxy butyrate  - now on Lantus 30U weight based   - ICU consult appreciated now longer requiring ICU level of care  - Metabolic anion gap acidosis with hypochloremia  - Endocrine consult pending  - diabetic educator and nutrition consult    HTN  - continue to monitor  - BP now better controlled  - may need to start antihypertensives   - outpatient f/u    Electrolyte abnormalities  - hypokalemia 2/2 to metabolic acidosis - replenished  - Hyponatremia, hypochloremia likely 2/2 to dehydration, acidosis  - continue to monitor and replenish as needed   - f.u repeat BMP 49M without any reported PMH who presents with dizziness and leg weakness, f/w hyperglycemia, DKA/HHS    DKA/HHS  - s/p Insulin gtt - FS remain elevated  - will continue IVF due to elevated BS, and electrolyte abnormalities  - anion gap now closed  - urine +ketone/hydroxy butyrate  - now on Lantus 30U weight based   - ICU consult appreciated now longer requiring ICU level of care  - Metabolic anion gap acidosis with hypochloremia  - Endocrine consult pending  - diabetic educator and nutrition consult    HTN  - stable  - significantly elevated BP on admission likely 2/2 to dehydration  - BP now improved  - will start low dose lisinopril, renal protection  - outpatient f/u    Electrolyte abnormalities  - hypokalemia 2/2 to metabolic acidosis - replenished  - Hyponatremia, hypochloremia likely 2/2 to dehydration, acidosis  - continue to monitor and replenish as needed   - f.u repeat BMP

## 2020-08-11 NOTE — PROGRESS NOTE ADULT - ATTENDING COMMENTS
I have personally seen and examined patient.  Above note reviewed and discussed with resident, modified where appropriate. I have personally seen and examined patient.  Above note reviewed and discussed with resident, modified where appropriate. Pt without any known hx of DM. Family hx of DM in mom. Found to be in DKA/ HHS. Gap closed. Transitioned to Lantus & humalog. Pt eating & drinking. Endocrine consult called. Diabetic teaching. Start lisinopril. Check islet cell antibodies & ANG        PHYSICAL EXAM-  GENERAL: NAD, well-groomed, well-developed  HEAD:  Atraumatic, Normocephalic  EYES: EOMI, PERRLA, conjunctiva and sclera clear  NECK: Supple, No JVD  NERVOUS SYSTEM:  Alert & Oriented X3, Motor Strength 5/5 B/L upper and lower extremities; DTRs 2+ intact and symmetric  CHEST/LUNG: Clear to auscultation bilaterally; No rales, rhonchi, wheezing, or rubs  HEART: Regular rate and rhythm; No murmurs, rubs, or gallops  ABDOMEN: Soft, Nontender, Nondistended; Bowel sounds present  EXTREMITIES:  2+ Peripheral Pulses, No clubbing, cyanosis, or edema  SKIN: No rashes or lesions

## 2020-08-11 NOTE — ED ADULT NURSE NOTE - CHIEF COMPLAINT QUOTE
Patient arrived to ED today with c/o dizziness, dry mouth, lost 30lbs in a month, weakness for the past month.

## 2020-08-11 NOTE — ED ADULT NURSE NOTE - ED STAT RN HANDOFF WHERE
[de-identified] : s/p  left total knee replacement 12/19/2019 [de-identified] : 66 year old male presents s/p left total knee replacement. Decrease in preoperative pain but still experiences constant pain lateral left thigh. Also notes chronic lower back pain with radiation into left buttock and proximal lateral thigh. Able to go up and down stairs. Struggling with full knee extension. Denies fever, chills, groin pain.  [de-identified] : well nourished and no distress\par left knee: incision healed, PROM 10/110 pain free, ligaments intact.  Neurovascularly intact. Tender junction middle thigh over vastus lateralis [de-identified] : left total knee replacement \par pain thigh of undetermined origin [de-identified] : Continue PT. Obtain MRI left thigh. FU with results. ED

## 2020-08-11 NOTE — ED ADULT NURSE REASSESSMENT NOTE - NS ED NURSE REASSESS COMMENT FT1
hourly updates made to MICU PA in regards to blood sugar. pt resting comfortably. IV fluids and medications infusing as ordered. additional urine tests sent. vitals remain stable.

## 2020-08-11 NOTE — ED ADULT NURSE REASSESSMENT NOTE - NS ED NURSE REASSESS COMMENT FT1
Received report from nightshift RN.  Pt a&ox4.  Pt denies pain and states he feels better.  NAD noted, respirations even and unlabored.  #18 to LAC, #20 to RAC, and #20 on right wrist patent with no signs of infiltration.  Pt resting comfortably in stretcher with safety precautions in place.  Plan of care explained, pt verbalized understanding.

## 2020-08-11 NOTE — ED ADULT NURSE REASSESSMENT NOTE - NS ED NURSE REASSESS COMMENT FT1
Pt a&ox4.  NAD noted.  Pt sitting comfortably in bed, provided with lunch.  Safety precautions in place.  Plan of care explained pt verbalized understanding.  ICU MD at bedside.

## 2020-08-11 NOTE — ED ADULT NURSE REASSESSMENT NOTE - NS ED NURSE REASSESS COMMENT FT1
Pt a&ox4 states he "feels fine".  NAD noted.  pt waiting to be admitted.  Comfort care provided.  Pt resting comfortably in stretcher.  Plan of care explained, pt verbalized understanding.

## 2020-08-12 VITALS
SYSTOLIC BLOOD PRESSURE: 133 MMHG | TEMPERATURE: 99 F | DIASTOLIC BLOOD PRESSURE: 80 MMHG | OXYGEN SATURATION: 98 % | RESPIRATION RATE: 20 BRPM | HEART RATE: 70 BPM

## 2020-08-12 DIAGNOSIS — E78.5 HYPERLIPIDEMIA, UNSPECIFIED: ICD-10-CM

## 2020-08-12 LAB
ANION GAP SERPL CALC-SCNC: 17 MMOL/L — SIGNIFICANT CHANGE UP (ref 5–17)
BASOPHILS # BLD AUTO: 0.03 K/UL — SIGNIFICANT CHANGE UP (ref 0–0.2)
BASOPHILS NFR BLD AUTO: 0.6 % — SIGNIFICANT CHANGE UP (ref 0–2)
BUN SERPL-MCNC: 8 MG/DL — SIGNIFICANT CHANGE UP (ref 8–20)
CALCIUM SERPL-MCNC: 9.1 MG/DL — SIGNIFICANT CHANGE UP (ref 8.6–10.2)
CHLORIDE SERPL-SCNC: 92 MMOL/L — LOW (ref 98–107)
CHOLEST SERPL-MCNC: 436 MG/DL — HIGH (ref 110–199)
CO2 SERPL-SCNC: 24 MMOL/L — SIGNIFICANT CHANGE UP (ref 22–29)
CREAT SERPL-MCNC: 0.52 MG/DL — SIGNIFICANT CHANGE UP (ref 0.5–1.3)
EOSINOPHIL # BLD AUTO: 0.05 K/UL — SIGNIFICANT CHANGE UP (ref 0–0.5)
EOSINOPHIL NFR BLD AUTO: 1 % — SIGNIFICANT CHANGE UP (ref 0–6)
GLUCOSE BLDC GLUCOMTR-MCNC: 247 MG/DL — HIGH (ref 70–99)
GLUCOSE BLDC GLUCOMTR-MCNC: 256 MG/DL — HIGH (ref 70–99)
GLUCOSE BLDC GLUCOMTR-MCNC: 263 MG/DL — HIGH (ref 70–99)
GLUCOSE SERPL-MCNC: 278 MG/DL — HIGH (ref 70–99)
HCT VFR BLD CALC: 41.5 % — SIGNIFICANT CHANGE UP (ref 39–50)
HDLC SERPL-MCNC: 25 MG/DL — LOW
HGB BLD-MCNC: 14.6 G/DL — SIGNIFICANT CHANGE UP (ref 13–17)
IMM GRANULOCYTES NFR BLD AUTO: 0.4 % — SIGNIFICANT CHANGE UP (ref 0–1.5)
LIPID PNL WITH DIRECT LDL SERPL: SIGNIFICANT CHANGE UP MG/DL
LYMPHOCYTES # BLD AUTO: 2.2 K/UL — SIGNIFICANT CHANGE UP (ref 1–3.3)
LYMPHOCYTES # BLD AUTO: 43 % — SIGNIFICANT CHANGE UP (ref 13–44)
MAGNESIUM SERPL-MCNC: 2 MG/DL — SIGNIFICANT CHANGE UP (ref 1.8–2.6)
MCHC RBC-ENTMCNC: 33 PG — SIGNIFICANT CHANGE UP (ref 27–34)
MCHC RBC-ENTMCNC: 35.2 GM/DL — SIGNIFICANT CHANGE UP (ref 32–36)
MCV RBC AUTO: 93.9 FL — SIGNIFICANT CHANGE UP (ref 80–100)
MONOCYTES # BLD AUTO: 0.59 K/UL — SIGNIFICANT CHANGE UP (ref 0–0.9)
MONOCYTES NFR BLD AUTO: 11.5 % — SIGNIFICANT CHANGE UP (ref 2–14)
NEUTROPHILS # BLD AUTO: 2.23 K/UL — SIGNIFICANT CHANGE UP (ref 1.8–7.4)
NEUTROPHILS NFR BLD AUTO: 43.5 % — SIGNIFICANT CHANGE UP (ref 43–77)
PHOSPHATE SERPL-MCNC: 4.1 MG/DL — SIGNIFICANT CHANGE UP (ref 2.4–4.7)
PLATELET # BLD AUTO: 129 K/UL — LOW (ref 150–400)
POTASSIUM SERPL-MCNC: 4 MMOL/L — SIGNIFICANT CHANGE UP (ref 3.5–5.3)
POTASSIUM SERPL-SCNC: 4 MMOL/L — SIGNIFICANT CHANGE UP (ref 3.5–5.3)
RBC # BLD: 4.42 M/UL — SIGNIFICANT CHANGE UP (ref 4.2–5.8)
RBC # FLD: 12.2 % — SIGNIFICANT CHANGE UP (ref 10.3–14.5)
SARS-COV-2 IGG SERPL QL IA: POSITIVE
SARS-COV-2 IGM SERPL IA-ACNC: 8.16 INDEX — HIGH
SODIUM SERPL-SCNC: 133 MMOL/L — LOW (ref 135–145)
TOTAL CHOLESTEROL/HDL RATIO MEASUREMENT: 17 RATIO — HIGH (ref 3.4–9.6)
TRIGL SERPL-MCNC: 521 MG/DL — HIGH (ref 10–200)
WBC # BLD: 5.12 K/UL — SIGNIFICANT CHANGE UP (ref 3.8–10.5)
WBC # FLD AUTO: 5.12 K/UL — SIGNIFICANT CHANGE UP (ref 3.8–10.5)

## 2020-08-12 PROCEDURE — 83605 ASSAY OF LACTIC ACID: CPT

## 2020-08-12 PROCEDURE — 82009 KETONE BODYS QUAL: CPT

## 2020-08-12 PROCEDURE — 85027 COMPLETE CBC AUTOMATED: CPT

## 2020-08-12 PROCEDURE — 86341 ISLET CELL ANTIBODY: CPT

## 2020-08-12 PROCEDURE — 80061 LIPID PANEL: CPT

## 2020-08-12 PROCEDURE — 99239 HOSP IP/OBS DSCHRG MGMT >30: CPT

## 2020-08-12 PROCEDURE — 82435 ASSAY OF BLOOD CHLORIDE: CPT

## 2020-08-12 PROCEDURE — 93005 ELECTROCARDIOGRAM TRACING: CPT

## 2020-08-12 PROCEDURE — 87635 SARS-COV-2 COVID-19 AMP PRB: CPT

## 2020-08-12 PROCEDURE — 84484 ASSAY OF TROPONIN QUANT: CPT

## 2020-08-12 PROCEDURE — 82947 ASSAY GLUCOSE BLOOD QUANT: CPT

## 2020-08-12 PROCEDURE — 70450 CT HEAD/BRAIN W/O DYE: CPT

## 2020-08-12 PROCEDURE — 82803 BLOOD GASES ANY COMBINATION: CPT

## 2020-08-12 PROCEDURE — 83735 ASSAY OF MAGNESIUM: CPT

## 2020-08-12 PROCEDURE — 36415 COLL VENOUS BLD VENIPUNCTURE: CPT

## 2020-08-12 PROCEDURE — 83935 ASSAY OF URINE OSMOLALITY: CPT

## 2020-08-12 PROCEDURE — 85014 HEMATOCRIT: CPT

## 2020-08-12 PROCEDURE — 82962 GLUCOSE BLOOD TEST: CPT

## 2020-08-12 PROCEDURE — 82570 ASSAY OF URINE CREATININE: CPT

## 2020-08-12 PROCEDURE — 84100 ASSAY OF PHOSPHORUS: CPT

## 2020-08-12 PROCEDURE — 84295 ASSAY OF SERUM SODIUM: CPT

## 2020-08-12 PROCEDURE — 83036 HEMOGLOBIN GLYCOSYLATED A1C: CPT

## 2020-08-12 PROCEDURE — 71045 X-RAY EXAM CHEST 1 VIEW: CPT

## 2020-08-12 PROCEDURE — 82436 ASSAY OF URINE CHLORIDE: CPT

## 2020-08-12 PROCEDURE — 84156 ASSAY OF PROTEIN URINE: CPT

## 2020-08-12 PROCEDURE — 82010 KETONE BODYS QUAN: CPT

## 2020-08-12 PROCEDURE — 99223 1ST HOSP IP/OBS HIGH 75: CPT

## 2020-08-12 PROCEDURE — 84300 ASSAY OF URINE SODIUM: CPT

## 2020-08-12 PROCEDURE — 84132 ASSAY OF SERUM POTASSIUM: CPT

## 2020-08-12 PROCEDURE — 80053 COMPREHEN METABOLIC PANEL: CPT

## 2020-08-12 PROCEDURE — 81003 URINALYSIS AUTO W/O SCOPE: CPT

## 2020-08-12 PROCEDURE — 82330 ASSAY OF CALCIUM: CPT

## 2020-08-12 PROCEDURE — 80048 BASIC METABOLIC PNL TOTAL CA: CPT

## 2020-08-12 PROCEDURE — 83986 ASSAY PH BODY FLUID NOS: CPT

## 2020-08-12 PROCEDURE — T1013: CPT

## 2020-08-12 PROCEDURE — 86769 SARS-COV-2 COVID-19 ANTIBODY: CPT

## 2020-08-12 PROCEDURE — 99285 EMERGENCY DEPT VISIT HI MDM: CPT | Mod: 25

## 2020-08-12 PROCEDURE — 83880 ASSAY OF NATRIURETIC PEPTIDE: CPT

## 2020-08-12 RX ORDER — INSULIN NPH HUM/REG INSULIN HM 70-30/ML
30 VIAL (ML) SUBCUTANEOUS
Qty: 1 | Refills: 0
Start: 2020-08-12 | End: 2020-09-10

## 2020-08-12 RX ORDER — FENOFIBRATE,MICRONIZED 130 MG
1 CAPSULE ORAL
Qty: 30 | Refills: 1
Start: 2020-08-12 | End: 2020-10-10

## 2020-08-12 RX ORDER — ATORVASTATIN CALCIUM 80 MG/1
80 TABLET, FILM COATED ORAL AT BEDTIME
Refills: 0 | Status: DISCONTINUED | OUTPATIENT
Start: 2020-08-12 | End: 2020-08-12

## 2020-08-12 RX ORDER — LISINOPRIL 2.5 MG/1
1 TABLET ORAL
Qty: 30 | Refills: 2
Start: 2020-08-12 | End: 2020-11-09

## 2020-08-12 RX ORDER — ATORVASTATIN CALCIUM 80 MG/1
1 TABLET, FILM COATED ORAL
Qty: 30 | Refills: 1
Start: 2020-08-12 | End: 2020-10-10

## 2020-08-12 RX ORDER — FENOFIBRATE,MICRONIZED 130 MG
145 CAPSULE ORAL DAILY
Refills: 0 | Status: DISCONTINUED | OUTPATIENT
Start: 2020-08-12 | End: 2020-08-12

## 2020-08-12 RX ADMIN — INSULIN GLARGINE 30 UNIT(S): 100 INJECTION, SOLUTION SUBCUTANEOUS at 08:35

## 2020-08-12 RX ADMIN — Medication 40 MILLIEQUIVALENT(S): at 05:30

## 2020-08-12 RX ADMIN — Medication 6: at 16:44

## 2020-08-12 RX ADMIN — Medication 7 UNIT(S): at 08:34

## 2020-08-12 RX ADMIN — LISINOPRIL 5 MILLIGRAM(S): 2.5 TABLET ORAL at 05:31

## 2020-08-12 RX ADMIN — Medication 7 UNIT(S): at 16:44

## 2020-08-12 RX ADMIN — Medication 7 UNIT(S): at 11:58

## 2020-08-12 RX ADMIN — ENOXAPARIN SODIUM 40 MILLIGRAM(S): 100 INJECTION SUBCUTANEOUS at 11:58

## 2020-08-12 RX ADMIN — Medication 6: at 11:59

## 2020-08-12 RX ADMIN — Medication 4: at 08:35

## 2020-08-12 NOTE — DISCHARGE NOTE NURSING/CASE MANAGEMENT/SOCIAL WORK - NSDCFUADDAPPT_GEN_ALL_CORE_FT
Appt [facility] Monday 8/24/20 at 2:00 with Dr Galindo Appt at [facility] in [place] on 8/13/2020 at 3pm  Appt at [facility] in [place] on 8/13/2020 at 3pm Dr Armin Butler..Pt given appt card too.

## 2020-08-12 NOTE — DISCHARGE NOTE PROVIDER - PROVIDER TOKENS
FREE:[LAST:[[facility]],PHONE:[(   )    -],FAX:[(   )    -],ADDRESS:[[facility]]],PROVIDER:[TOKEN:[[id number]]]

## 2020-08-12 NOTE — DISCHARGE NOTE PROVIDER - NSDCCPCAREPLAN_GEN_ALL_CORE_FT
PRINCIPAL DISCHARGE DIAGNOSIS  Diagnosis: Hyperosmolar non-ketotic state due to type 2 diabetes mellitus  Assessment and Plan of Treatment: Continue with meds as directed. Follow up with your primary doctor & endocrinology within 1 week of discharge      SECONDARY DISCHARGE DIAGNOSES  Diagnosis: Mild HTN  Assessment and Plan of Treatment: Follow up with your primary doctor within 1 week of discharge. DASH diet recommended

## 2020-08-12 NOTE — DISCHARGE NOTE PROVIDER - NSDCMRMEDTOKEN_GEN_ALL_CORE_FT
.DME supplies: vials, syringes, needles, glucometer, test strips, alcohol swabs, lancets for testing bid  atorvastatin 40 mg oral tablet: 1 tab(s) orally once a day (at bedtime)   lisinopril 5 mg oral tablet: 1 tab(s) orally once a day  NovoLIN 70/30 subcutaneous suspension: 30 unit(s) subcutaneous 2 times a day (with meals) , with breakfast &amp; dinner  TriCor 145 mg oral tablet: 1 tab(s) orally once a day

## 2020-08-12 NOTE — CONSULT NOTE ADULT - SUBJECTIVE AND OBJECTIVE BOX
HPI:    49M without any reported PMH who presents with dizziness and leg weakness. He was found with hyperglycemia and DKA/HHS. He reports over the past 1 month he has not been feeling well, and has been frequently urinating multiple times per day. He does report drinking sugary drinks. Denies any fevers, chills, CP, palpitations, SOB, cough, N/V/D. He denies any syncope. In ED found with hyperglycemia and DKA/HHS, was given IVF and started on insulin gtt.     PAST MEDICAL & SURGICAL HISTORY:  No pertinent past medical history  No significant past surgical history    Allergies  No Known Allergies    Intolerances      FAMILY HISTORY:  Family history otherwise noncontributory.    Social History:   Denies smoking    Review of Systems:  CONSTITUTIONAL:  No fevers, chills  HEAD: No headache, +lightheadedness  EYES: No blurry vision  ENT: No epistaxis, rhinorrhea, sore throat  CARDIOVASCULAR:  No chest pain, no palpitations  RESPIRATORY:  As per HPI  GASTROINTESTINAL:  No N/V/D  GENITOURINARY:  +Urinary frequency  NEUROLOGIC:  No seizures or headaches  PSYCHIATRIC:  No disorder of thought or mood    Medications:insulin regular Infusion 9 Unit(s)/Hr IV Continuous   multiple electrolytes Injection Type 1 1000 milliLiter(s) IV Continuous <Continuous>      ICU Vital Signs Last 24 Hrs  T(C): 37.3 (10 Aug 2020 17:58), Max: 37.3 (10 Aug 2020 17:58)  T(F): 99.2 (10 Aug 2020 17:58), Max: 99.2 (10 Aug 2020 17:58)  HR: 99 (10 Aug 2020 17:58) (99 - 99)  BP: 193/114 (10 Aug 2020 17:58) (193/114 - 193/114)  BP(mean): --  ABP: --  ABP(mean): --  RR: 18 (10 Aug 2020 17:58) (18 - 18)  SpO2: 98% (10 Aug 2020 17:58) (98% - 98%)    Vital Signs Last 24 Hrs  T(C): 37.3 (10 Aug 2020 17:58), Max: 37.3 (10 Aug 2020 17:58)  T(F): 99.2 (10 Aug 2020 17:58), Max: 99.2 (10 Aug 2020 17:58)  HR: 99 (10 Aug 2020 17:58) (99 - 99)  BP: 193/114 (10 Aug 2020 17:58) (193/114 - 193/114)  BP(mean): --  RR: 18 (10 Aug 2020 17:58) (18 - 18)  SpO2: 98% (10 Aug 2020 17:58) (98% - 98%)    LABS:                      14.5   5.70  )-----------( 137      ( 10 Aug 2020 19:47 )             40.7     08-10    125<L>  |  78<L>  |  11.0  ----------------------------<  606<HH>  4.1   |  16.0<L>  |  0.76    Ca    9.8      10 Aug 2020 19:47  Mg     2.1     08-10    TPro  8.3  /  Alb  4.5  /  TBili  1.0  /  DBili  x   /  AST  138<H>  /  ALT  178<H>  /  AlkPhos  296<H>  08-10      CARDIAC MARKERS ( 10 Aug 2020 19:47 )  x     / <0.01 ng/mL / x     / x     / x        Physical Examination:  GENERAL: In NAD   HEENT: NC/AT  NECK: Supple, trachea midline  PULM: CTA B/L, good inspiratory effort  CVS: +S1, S2, RRR  ABD: Soft, NT/ND  EXT: No pedal edema  SKIN: Warm and well perfused, no rashes noted.  NEURO: Alert, oriented and interactive    RADIOLOGY:   [facility] (8/10/20) - no acute intracranial findings  CRITICAL CARE TIME SPENT: 30 (10 Aug 2020 21:50)    PAST MEDICAL & SURGICAL HISTORY:  No pertinent past medical history  No significant past surgical history HPI:    49M without any reported PMH who presents with dizziness and leg weakness. He was found with hyperglycemia and DKA/HHS. He reports over the past 1 month he has not been feeling well, and has been frequently urinating multiple times per day. He does report drinking sugary drinks. Denies any fevers, chills, CP, palpitations, SOB, cough, N/V/D. He denies any syncope. In ED found with hyperglycemia and DKA/HHS, was given IVF and started on insulin gtt. Bgs in better range 200's now and he is feeling better, Has family h/o dm2.    PAST MEDICAL & SURGICAL HISTORY:  No pertinent past medical history  No significant past surgical history    Allergies  No Known Allergies    FAMILY HISTORY:  mother and sister witH DM2.     Social History:   Denies smoking, , children, no drugs, no etoh     Review of Systems:  CONSTITUTIONAL:  No fevers, chills, lost 20 lbs in the last 3  mos.   HEAD: No headache, +lightheadedness  EYES: HAS  blurry vision  ENT: No epistaxis, rhinorrhea, sore throat  CARDIOVASCULAR:  No chest pain, no palpitations  RESPIRATORY:  As per HPI  GASTROINTESTINAL:  No N/V/D  GENITOURINARY:  has polyuria polydipsia nocturia.   NEUROLOGIC:  No seizures or headaches  PSYCHIATRIC:  No disorder of thought or mood    Medications: insulin regular Infusion 9 Unit(s)/Hr IV Continuous   multiple electrolytes Injection Type 1 1000 milliLiter(s) IV Continuous <Continuous>      ICU Vital Signs Last 24 Hrs  T(C): 37.3 (10 Aug 2020 17:58), Max: 37.3 (10 Aug 2020 17:58)  T(F): 99.2 (10 Aug 2020 17:58), Max: 99.2 (10 Aug 2020 17:58)  HR: 99 (10 Aug 2020 17:58) (99 - 99)  BP: 193/114 (10 Aug 2020 17:58) (193/114 - 193/114)  BP(mean): --  ABP: --  ABP(mean): --  RR: 18 (10 Aug 2020 17:58) (18 - 18)  SpO2: 98% (10 Aug 2020 17:58) (98% - 98%)    Vital Signs Last 24 Hrs  T(C): 37.3 (10 Aug 2020 17:58), Max: 37.3 (10 Aug 2020 17:58)  T(F): 99.2 (10 Aug 2020 17:58), Max: 99.2 (10 Aug 2020 17:58)  HR: 99 (10 Aug 2020 17:58) (99 - 99)  BP: 193/114 (10 Aug 2020 17:58) (193/114 - 193/114)  BP(mean): --  RR: 18 (10 Aug 2020 17:58) (18 - 18)  SpO2: 98% (10 Aug 2020 17:58) (98% - 98%)    LABS:                      14.5   5.70  )-----------( 137      ( 10 Aug 2020 19:47 )             40.7     08-10    125<L>  |  78<L>  |  11.0  ----------------------------<  606<HH>  4.1   |  16.0<L>  |  0.76    Ca    9.8      10 Aug 2020 19:47  Mg     2.1     08-10    TPro  8.3  /  Alb  4.5  /  TBili  1.0  /  DBili  x   /  AST  138<H>  /  ALT  178<H>  /  AlkPhos  296<H>  08-10      CARDIAC MARKERS ( 10 Aug 2020 19:47 )  x     / <0.01 ng/mL / x     / x     / x        Physical Examination:  GENERAL: In NAD   HEENT: NC/AT  NECK: Supple, trachea midline  PULM: CTA B/L, good inspiratory effort  CVS: +S1, S2, RRR  ABD: Soft, NT/ND  EXT: No pedal edema  SKIN: Warm and well perfused, no rashes noted.  NEURO: Alert, oriented and interactive    RADIOLOGY:   [facility] (8/10/20) - no acute intracranial findings  CRITICAL CARE TIME SPENT: 30 (10 Aug 2020 21:50)

## 2020-08-12 NOTE — CONSULT NOTE ADULT - ASSESSMENT
49M without any reported PMH who presents with dizziness and leg weakness, f/w hyperglycemia, DKA/HHS    DKA/HHS  - s/p Insulin gtt - FS remain elevated  - will continue IVF due to elevated BS, and electrolyte abnormalities  - anion gap now closed  - urine +ketone/hydroxy butyrate  - now on Lantus 30U weight based   - ICU consult appreciated now longer requiring ICU level of care  - Metabolic anion gap acidosis with hypochloremia  - Endocrine consult pending  - diabetic educator and nutrition consult    HTN  - significantly elevated BP on admission likely 2/2 to dehydration  - will start low dose lisinopril, renal protection    Electrolyte abnormalities  - hypokalemia 2/2 to metabolic acidosis - replenished  - Hyponatremia, hypochloremia likely 2/2 to dehydration, acidosis  - continue to monitor and replenish as needed 49M without any reported PMH who presents with dizziness and leg weakness, f/w hyperglycemia, DKA/HHS     DKA/HHS; resolved now after insulin started,   - s/p Insulin gtt - FS remain elevated  - increase lantus to 30 u hs   - increase lispro to 7 u TID w meals.   - he cannot afford basal bolus so he can be discharged on insulin mix 70/30   - diabetic educator seen. he will followup with [facility]     HTN  - significantly elevated BP on admission likely 2/2 to dehydration  - started low dose lisinopril, renal protection    Electrolyte abnormalities  - hypokalemia 2/2 to metabolic acidosis - replenished  - Hyponatremia, hypochloremia likely 2/2 to dehydration, acidosis  - continue to monitor and replenish as needed

## 2020-08-12 NOTE — DISCHARGE NOTE NURSING/CASE MANAGEMENT/SOCIAL WORK - PATIENT PORTAL LINK FT
You can access the FollowMyHealth Patient Portal offered by [facility] by registering at the following website: [email or web address]. By joining [employer]’s FollowMyHealth portal, you will also be able to view your health information using other applications (apps) compatible with our system.

## 2020-08-12 NOTE — DISCHARGE NOTE PROVIDER - CARE PROVIDER_API CALL
OLGA López,   [facility] [place]  Phone: (   )    -  Fax: (   )    -  Follow Up Time:     Anat Mccall  ENDOCRINOLOGY/METAB/DIABETES  [address] [place] [address]  Phone: [phone]  Fax: [phone]  Follow Up Time:

## 2020-08-12 NOTE — ADVANCED PRACTICE NURSE CONSULT - RECOMMEDATIONS
continue diabetes self management education  pls consider increase lantus to 30 units in am and   humalog 7 units before meals + ss  pls consider dc pt on novolin 70/30 vial and syringe 25 units w breakfast and dinner  glucose meter strips and lancets for 3xdaily  cc- pls set fu appointment w hr to improve glycemic control continue diabetes self management education  pls consider increase lantus to 30 units in am and   humalog 7 units before meals + ss  pls consider dc pt on novolin 70/30 vial and syringe 30 units w breakfast and dinner  glucose meter strips and lancets for 3xdaily  cc- pls set fu appointment w hr to improve glycemic control

## 2020-08-12 NOTE — ADVANCED PRACTICE NURSE CONSULT - ASSESSMENT
went to see pt in am, pt is a+ox3 co 0 pain, pt states he was never dx w diabetes this is the first time it happen to him. pt does co frequent urination and thirst. pt was educated about diabetes disease process and the importance of of using inuslin @ this time. pt verbalized understanding and states that he did it this morning and he feels he can do it. pt was advised to fu w [facility] to improve glycemic control and to get his insulin supply form [facility] in order to keep it affordable. pt verbalized understanding

## 2020-08-12 NOTE — DISCHARGE NOTE PROVIDER - HOSPITAL COURSE
49M without any reported PMHx, does not really see PCP, questionable HTN in the past, who presented with c/o dizziness, leg weakness and urinary frequency. He was found to have hyperglycemia and DKA/HHS. He reports over the past 1 month he has not been feeling well, and has been frequently urinating multiple times per day. He does report drinking sugary drinks.        Denies any fevers, chills, CP, palpitations, SOB, cough, N/V/D. He denies any syncope.        In ED found to have DKA/HHS, anion gap metabolic acidosis, was given aggressive fluid resuscitation and started on insulin gtt. Now anion gap closed and patient downgraded from MICU care.        DKA/HHS    - s/p Insulin gtt     - anion gap now closed    - now on Lantus 30U weight based     - Metabolic anion gap acidosis with hypochloremia    - Endocrine consult noted    - diabetic educator and nutrition consult    WIll d/c pt on Novulin 70/30        HTN    - stable    - significantly elevated BP on admission likely 2/2 to dehydration    - BP now improved    - will start low dose lisinopril, renal protection    - outpatient f/u        HLD- will d/c pt on statin & fenofibrate        Electrolyte abnormalities    resolved            PHYSICAL EXAM-    GENERAL: NAD, well-groomed, well-developed    HEAD:  Atraumatic, Normocephalic    EYES: EOMI, PERRLA, conjunctiva and sclera clear    NECK: Supple, No JVD    NERVOUS SYSTEM:  Alert & Oriented X3, Motor Strength 5/5 B/L upper and lower extremities; DTRs 2+ intact and symmetric    CHEST/LUNG: Clear to auscultation bilaterally; No rales, rhonchi, wheezing, or rubs    HEART: Regular rate and rhythm; No murmurs, rubs, or gallops    ABDOMEN: Soft, Nontender, Nondistended; Bowel sounds present    EXTREMITIES:  2+ Peripheral Pulses, No clubbing, cyanosis, or edema    SKIN: No rashes or lesions        VSS. Medically stabel fro d/c home

## 2020-08-12 NOTE — DISCHARGE NOTE PROVIDER - CARE PROVIDERS DIRECT ADDRESSES
,DirectAddress_Unknown,[email or web address]

## 2020-08-12 NOTE — DISCHARGE NOTE PROVIDER - NSDCFUADDAPPT_GEN_ALL_CORE_FT
Appt at [facility] in [place] on 8/13/2020 at 3pm Dr Armin Butler..Pt given appt card too.

## 2020-08-14 LAB — ISLET CELL512 AB SER-ACNC: SIGNIFICANT CHANGE UP

## 2020-08-17 LAB — ISLET CELL512 AB SER-SCNC: 0 NMOL/L — SIGNIFICANT CHANGE UP

## 2020-08-19 LAB — GAD65 AB SER-MCNC: 0.02 NMOL/L — SIGNIFICANT CHANGE UP

## 2022-12-19 ENCOUNTER — EMERGENCY (EMERGENCY)
Facility: HOSPITAL | Age: 52
LOS: 1 days | Discharge: HOME | End: 2022-12-19
Attending: EMERGENCY MEDICINE
Payer: SELF-PAY

## 2022-12-19 VITALS
TEMPERATURE: 98 F | SYSTOLIC BLOOD PRESSURE: 195 MMHG | HEART RATE: 82 BPM | HEIGHT: 67 IN | RESPIRATION RATE: 18 BRPM | DIASTOLIC BLOOD PRESSURE: 106 MMHG | OXYGEN SATURATION: 97 %

## 2022-12-19 VITALS
SYSTOLIC BLOOD PRESSURE: 156 MMHG | OXYGEN SATURATION: 99 % | DIASTOLIC BLOOD PRESSURE: 106 MMHG | HEART RATE: 86 BPM | RESPIRATION RATE: 18 BRPM

## 2022-12-19 PROBLEM — Z78.9 OTHER SPECIFIED HEALTH STATUS: Chronic | Status: ACTIVE | Noted: 2020-08-10

## 2022-12-19 LAB
ALBUMIN SERPL ELPH-MCNC: 4.3 G/DL — SIGNIFICANT CHANGE UP (ref 3.3–5.2)
ALP SERPL-CCNC: 97 U/L — SIGNIFICANT CHANGE UP (ref 40–120)
ALT FLD-CCNC: 66 U/L — HIGH
ANION GAP SERPL CALC-SCNC: 12 MMOL/L — SIGNIFICANT CHANGE UP (ref 5–17)
AST SERPL-CCNC: 71 U/L — HIGH
BILIRUB SERPL-MCNC: 0.6 MG/DL — SIGNIFICANT CHANGE UP (ref 0.4–2)
BUN SERPL-MCNC: 7.3 MG/DL — LOW (ref 8–20)
CALCIUM SERPL-MCNC: 9.2 MG/DL — SIGNIFICANT CHANGE UP (ref 8.4–10.5)
CHLORIDE SERPL-SCNC: 96 MMOL/L — SIGNIFICANT CHANGE UP (ref 96–108)
CO2 SERPL-SCNC: 26 MMOL/L — SIGNIFICANT CHANGE UP (ref 22–29)
CREAT SERPL-MCNC: 0.48 MG/DL — LOW (ref 0.5–1.3)
EGFR: 126 ML/MIN/1.73M2 — SIGNIFICANT CHANGE UP
GLUCOSE SERPL-MCNC: 105 MG/DL — HIGH (ref 70–99)
HCT VFR BLD CALC: 46.6 % — SIGNIFICANT CHANGE UP (ref 39–50)
HGB BLD-MCNC: 16.2 G/DL — SIGNIFICANT CHANGE UP (ref 13–17)
MCHC RBC-ENTMCNC: 31 PG — SIGNIFICANT CHANGE UP (ref 27–34)
MCHC RBC-ENTMCNC: 34.8 GM/DL — SIGNIFICANT CHANGE UP (ref 32–36)
MCV RBC AUTO: 89.1 FL — SIGNIFICANT CHANGE UP (ref 80–100)
PLATELET # BLD AUTO: 214 K/UL — SIGNIFICANT CHANGE UP (ref 150–400)
POTASSIUM SERPL-MCNC: 6 MMOL/L — HIGH (ref 3.5–5.3)
POTASSIUM SERPL-SCNC: 6 MMOL/L — HIGH (ref 3.5–5.3)
PROT SERPL-MCNC: 8.3 G/DL — SIGNIFICANT CHANGE UP (ref 6.6–8.7)
RBC # BLD: 5.23 M/UL — SIGNIFICANT CHANGE UP (ref 4.2–5.8)
RBC # FLD: 12.5 % — SIGNIFICANT CHANGE UP (ref 10.3–14.5)
SODIUM SERPL-SCNC: 134 MMOL/L — LOW (ref 135–145)
WBC # BLD: 9.92 K/UL — SIGNIFICANT CHANGE UP (ref 3.8–10.5)
WBC # FLD AUTO: 9.92 K/UL — SIGNIFICANT CHANGE UP (ref 3.8–10.5)

## 2022-12-19 PROCEDURE — 93010 ELECTROCARDIOGRAM REPORT: CPT

## 2022-12-19 PROCEDURE — 99284 EMERGENCY DEPT VISIT MOD MDM: CPT

## 2022-12-19 PROCEDURE — 82962 GLUCOSE BLOOD TEST: CPT

## 2022-12-19 PROCEDURE — 80053 COMPREHEN METABOLIC PANEL: CPT

## 2022-12-19 PROCEDURE — 85027 COMPLETE CBC AUTOMATED: CPT

## 2022-12-19 PROCEDURE — 93005 ELECTROCARDIOGRAM TRACING: CPT

## 2022-12-19 PROCEDURE — 36415 COLL VENOUS BLD VENIPUNCTURE: CPT

## 2022-12-19 RX ORDER — NITROGLYCERIN 6.5 MG
0.5 CAPSULE, EXTENDED RELEASE ORAL ONCE
Refills: 0 | Status: DISCONTINUED | OUTPATIENT
Start: 2022-12-19 | End: 2022-12-27

## 2022-12-19 RX ADMIN — Medication 0.1 MILLIGRAM(S): at 18:35

## 2022-12-19 RX ADMIN — Medication 0.1 MILLIGRAM(S): at 22:25

## 2022-12-19 NOTE — ED STATDOCS - NSFOLLOWUPINSTRUCTIONS_ED_ALL_ED_FT
Mareos    Dizziness      Los mareos son un problema muy frecuente. Se trata de vilma sensación de inestabilidad o desvanecimiento. Puede sentir que se va a desmayar. Los mareos pueden provocarle vilma lesión si se tropieza o se . Las personas de todas las edades pueden sufrir mareos, katelyn es más frecuente en los adultos mayores. Esta afección puede tener muchas causas, entre las que se pueden mencionar los medicamentos, la deshidratación y las enfermedades.      Siga estas instrucciones en kirk casa:      Comida y bebida   A comparison of three sample cups showing dark yellow, yellow, and pale yellow urine.   •Alyssia suficiente líquido rashmi para mantener la orina de color amarillo pálido. Cambalache abel la deshidratación. Trate de beber más líquidos transparentes, rashmi agua.      • No alyssia alcohol.      •Limite el consumo de cafeína si el médico se lo indica. Verifique los ingredientes y la información nutricional para saber si un alimento o vilma bebida contienen cafeína.      •Limite el consumo de sal (sodio) si el médico se lo indica. Verifique los ingredientes y la información nutricional para saber si un alimento o vilma bebida contienen sodio.        Actividad   A sign showing that a person should not drive. •Evite los movimientos rápidos.  •Levántese de las gaib con lentitud y apóyese hasta sentirse dean.      •Por la mañana, siéntese durga a un lado de la cama. Cuando se sienta dean, póngase lentamente de pie mientras se sostiene de algo, hasta que sepa que ha logrado el equilibrio.        •Mueva las piernas con frecuencia si debe estar de pie en un lugar suzanne mucho tiempo. Mientras esté de pie, contraiga y relaje los músculos de las piernas.      • No conduzca vehículos ni opere maquinaria si se siente mareado.      •Evite agacharse si se siente mareado. En kirk casa, coloque los objetos de modo que le resulte fácil alcanzarlos sin agacharse.      Estilo de erum     • No consuma ningún producto que contenga nicotina o tabaco. Estos productos incluyen cigarrillos, tabaco para mascar y aparatos de vapeo, rashmi los cigarrillos electrónicos. Si necesita ayuda para dejar de fumar, consulte al médico.      •Trate de reducir el nivel de estrés con métodos rashmi el yoga o la meditación. Hable con el médico si necesita ayuda para controlar el nivel de estrés.      Instrucciones generales     •Controle nandini mareos para sandra si hay cambios.      •Use los medicamentos de venta sandra y los recetados solamente rashmi se lo haya indicado el médico. Hable con el médico si candi que los medicamentos que está tomando son la causa de nandini mareos.      •Infórmele a un amigo o a un familiar si se siente mareado. Pídale a esta persona que llame al médico si observa cambios en kirk comportamiento.      •Concurra a todas las visitas de seguimiento. Cambalache es importante.        Comuníquese con un médico si:    •Los mareos no desaparecen o tiene síntomas nuevos.      •Los mareos o la sensación de desvanecimiento empeoran.      •Siente náuseas.      •Se le redujo la audición.      •Tiene fiebre.      •Dolor o rigidez en el frank.      •Los mareos derivan en vilma lesión o vilma caída.        Solicite ayuda de inmediato si:    •Vomita o tiene diarrea y no puede comer ni beber nada.      •Tiene dificultad para hablar, caminar, tragar o usar los brazos, las ivone o las piernas.      •Se siente constantemente débil.      •Tiene cualquier tipo de sangrado.      •No piensa con claridad o tiene dificultad para armar oraciones. Es posible que un amigo o un familiar adviertan que esto ocurre.      •Tiene dolor de pecho, dolor abdominal, sudoración o le falta el aire.      •Tiene cambios en la visión o le aparece un dolor de [place] intenso.      Estos síntomas pueden representar un problema grave que constituye vilma emergencia. No espere a sandra si los síntomas desaparecen. Solicite atención médica de inmediato. Comuníquese con el servicio de emergencias de kirk localidad (911 en los [place]). No conduzca por nandini propios medios hasta el hospital.       Resumen    •Los mareos son vilma sensación de inestabilidad o desvanecimiento. Esta afección puede tener muchas causas, entre las que se pueden mencionar los medicamentos, la deshidratación y las enfermedades.      •Las personas de todas las edades pueden sufrir mareos, katelyn es más frecuente en los adultos mayores.      •Alyssia suficiente líquido rashmi para mantener la orina de color amarillo pálido. No alyssia alcohol.      •Evite los movimientos rápidos si se siente mareado. Controle nandini mareos para sandra si hay cambios.      Esta información no tiene rashmi fin reemplazar el consejo del médico. Asegúrese de hacerle al médico cualquier pregunta que tenga.      - Please follow-up with your primary care doctor.  Please call for an appointment in the next 1-3 days but if you cannot follow-up with your primary care doctor please return to the ED for any urgent issues.  - You were given a copy of the tests performed today.  Please bring the results with you and review them with your primary care doctor.  - If you have any worsening of symptoms or any other concerns please return to the ED immediately.  - Please continue taking your home medications as directed. - Please follow-up with your primary care doctor.  Please call for an appointment in the next 1-3 days but if you cannot follow-up with your primary care doctor please return to the ED for any urgent issues.  - You were given a copy of the tests performed today.  Please bring the results with you and review them with your primary care doctor.  - If you have any worsening of symptoms or any other concerns please return to the ED immediately.  - Please continue taking your home medications as directed.      Mareos    Dizziness      Los mareos son un problema muy frecuente. Se trata de vilma sensación de inestabilidad o desvanecimiento. Puede sentir que se va a desmayar. Los mareos pueden provocarle vilma lesión si se tropieza o se . Las personas de todas las edades pueden sufrir mareos, katelyn es más frecuente en los adultos mayores. Esta afección puede tener muchas causas, entre las que se pueden mencionar los medicamentos, la deshidratación y las enfermedades.      Siga estas instrucciones en kirk casa:      Comida y bebida   A comparison of three sample cups showing dark yellow, yellow, and pale yellow urine.   •Alyssia suficiente líquido rashmi para mantener la orina de color amarillo pálido. [place] abel la deshidratación. Trate de beber más líquidos transparentes, rashmi agua.      • No alyssia alcohol.      •Limite el consumo de cafeína si el médico se lo indica. Verifique los ingredientes y la información nutricional para saber si un alimento o vilma bebida contienen cafeína.      •Limite el consumo de sal (sodio) si el médico se lo indica. Verifique los ingredientes y la información nutricional para saber si un alimento o vilma bebida contienen sodio.        Actividad   A sign showing that a person should not drive. •Evite los movimientos rápidos.  •Levántese de las gabi con lentitud y apóyese hasta sentirse dean.      •Por la mañana, siéntese durga a un lado de la cama. Cuando se sienta dean, póngase lentamente de pie mientras se sostiene de algo, hasta que sepa que ha logrado el equilibrio.        •Mueva las piernas con frecuencia si debe estar de pie en un lugar suzanne mucho tiempo. Mientras esté de pie, contraiga y relaje los músculos de las piernas.      • No conduzca vehículos ni opere maquinaria si se siente mareado.      •Evite agacharse si se siente mareado. En kirk casa, coloque los objetos de modo que le resulte fácil alcanzarlos sin agacharse.      Estilo de erum     • No consuma ningún producto que contenga nicotina o tabaco. Estos productos incluyen cigarrillos, tabaco para mascar y aparatos de vapeo, rashmi los cigarrillos electrónicos. Si necesita ayuda para dejar de fumar, consulte al médico.      •Trate de reducir el nivel de estrés con métodos rashmi el yoga o la meditación. Hable con el médico si necesita ayuda para controlar el nivel de estrés.      Instrucciones generales     •Controle nandini mareos para sandra si hay cambios.      •Use los medicamentos de venta sandra y los recetados solamente rashmi se lo haya indicado el médico. Hable con el médico si candi que los medicamentos que está tomando son la causa de nandini mareos.      •Infórmele a un amigo o a un familiar si se siente mareado. Pídale a esta persona que llame al médico si observa cambios en kirk comportamiento.      •Concurra a todas las visitas de seguimiento. [place] es importante.        Comuníquese con un médico si:    •Los mareos no desaparecen o tiene síntomas nuevos.      •Los mareos o la sensación de desvanecimiento empeoran.      •Siente náuseas.      •Se le redujo la audición.      •Tiene fiebre.      •Dolor o rigidez en el frank.      •Los mareos derivan en vilma lesión o vilma caída.        Solicite ayuda de inmediato si:    •Vomita o tiene diarrea y no puede comer ni beber nada.      •Tiene dificultad para hablar, caminar, tragar o usar los brazos, las ivone o las piernas.      •Se siente constantemente débil.      •Tiene cualquier tipo de sangrado.      •No piensa con claridad o tiene dificultad para armar oraciones. Es posible que un amigo o un familiar adviertan que esto ocurre.      •Tiene dolor de pecho, dolor abdominal, sudoración o le falta el aire.      •Tiene cambios en la visión o le aparece un dolor de [place] intenso.      Estos síntomas pueden representar un problema grave que constituye vilma emergencia. No espere a sandra si los síntomas desaparecen. Solicite atención médica de inmediato. Comuníquese con el servicio de emergencias de kirk localidad (911 en [place]). No conduzca por nandini propios medios hasta el hospital.       Resumen    •Los mareos son vilma sensación de inestabilidad o desvanecimiento. Esta afección puede tener muchas causas, entre las que se pueden mencionar los medicamentos, la deshidratación y las enfermedades.      •Las personas de todas las edades pueden sufrir mareos, katelyn es más frecuente en los adultos mayores.      •Alyssia suficiente líquido rashmi para mantener la orina de color amarillo pálido. No alyssia alcohol.      •Evite los movimientos rápidos si se siente mareado. Controle nandini mareos para sandra si hay cambios.      Esta información no tiene rashmi fin reemplazar el consejo del médico. Asegúrese de hacerle al médico cualquier pregunta que tenga.      - Please follow-up with your primary care doctor.  Please call for an appointment in the next 1-3 days but if you cannot follow-up with your primary care doctor please return to the ED for any urgent issues.  - You were given a copy of the tests performed today.  Please bring the results with you and review them with your primary care doctor.  - If you have any worsening of symptoms or any other concerns please return to the ED immediately.  - Please continue taking your home medications as directed.

## 2022-12-19 NOTE — ED STATDOCS - PATIENT PORTAL LINK FT
You can access the FollowMyHealth Patient Portal offered by [facility] by registering at the following website: [email or web address]. By joining [employer]’s FollowMyHealth portal, you will also be able to view your health information using other applications (apps) compatible with our system. You can access the FollowMyHealth Patient Portal offered by [facility] by registering at the following website: [email or web address]. By joining [employer]’s FollowMyHealth portal, you will also be able to view your health information using other applications (apps) compatible with our system.

## 2022-12-19 NOTE — ED STATDOCS - OBJECTIVE STATEMENT
53 y/o male with PMHx of HTN, HLD, and diabetes presents to ED c/o hyperglycemia and dizziness since yesterday. Pt takes metformin and pills for his BP and diabetes.

## 2022-12-19 NOTE — ED ADULT NURSE NOTE - OBJECTIVE STATEMENT
Pt a&ox4, respirations even and unlabored, complains of hypertension. Pt denies SOB, chest pain, or dizziness. Labs sent, medication administered.

## 2022-12-19 NOTE — ED STATDOCS - NS_ ATTENDINGSCRIBEDETAILS _ED_A_ED_FT
I, Marco A Lyles, performed the initial face to face bedside interview with this patient regarding history of present illness, review of symptoms and relevant past medical, social and family history.  I completed an independent physical examination.  I was the initial provider who evaluated this patient. I have signed out the follow up of any pending tests (i.e. labs, radiological studies) to the ACP.  I have communicated the patient’s plan of care and disposition with the ACP.  The history, relevant review of systems, past medical and surgical history, medical decision making, and physical examination was documented by the scribe in my presence and I attest to the accuracy of the documentation.

## 2022-12-19 NOTE — ED STATDOCS - PROGRESS NOTE DETAILS
stable on reassessment. reports improved dizziness. meds given for elevated BP. Denies chest pain, shortness of breath. -Nikhil, DO repeat BP stable. will dc with pcp follow up. -DO Nikhil repeat BP stable. normal ECG. asymptomatic now. will dc with pcp follow up. -DO Nikhil